# Patient Record
(demographics unavailable — no encounter records)

---

## 2025-04-29 NOTE — ASSESSMENT
[FreeTextEntry1] : The patient was advised of the diagnosis. The natural history of the pathology was explained in full to the patient in layman's terms. All questions were answered. The risks and benefits of surgical and non-surgical treatment alternatives were explained in full to the patient.  A brace was applied.  The importance of ice and elevation were discussed with the patient.  The risks were also discussed such as compartment syndrome and skin breakdown.  They were instructed to never put foreign objects down the brace.  Patients should call for increasing pain, worsening swelling, numbness, extremity discoloration, or any other concerns.   Left gamekeeper brace provided to patient.  Left thumb MRI rx provided to patient. Right elbow MRI rx provided to patient Right shoulder MRI rx provided to patient  RTO after MRI
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
## 2025-04-29 NOTE — IMAGING
[de-identified] : Left wrist/hand Multiple abrasions dorsum and radial aspect of the thumb with ecchymosis.  No snuff box ttp No distal radius ttp Proximal phalanx ttp thumb No UCL or RCL ttp Pain with stress of UCL with no opening to stress NVID  Right wrist No snuffbox ttp Dorsal SL, dorsal scaphoid ttp  Right shoulder Skin intact NVID + Impingement + empty can + Speed - Posterior lift off Abduction to 180 with pain FF to 100 TTP anterior shoulder biceps   Right elbow + ecchymosis over posterior aspect of olecranon 3-4 abrasions posterior elbow NVID TTP tip of olecranon
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
## 2025-05-08 NOTE — IMAGING
[de-identified] : Left wrist/hand Multiple abrasions dorsum and radial aspect of the thumb with ecchymosis.  No snuff box ttp No distal radius ttp Proximal phalanx ttp thumb No UCL or RCL ttp Pain with stress of UCL with no opening to stress NVID  Right wrist No snuffbox ttp Dorsal SL, dorsal scaphoid ttp  Right shoulder Skin intact NVID + Impingement + empty can + Speed - Posterior lift off Abduction to 180 with pain FF to 100 TTP anterior shoulder biceps   Right elbow + ecchymosis over posterior aspect of olecranon 3-4 abrasions posterior elbow NVID TTP tip of olecranon
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
## 2025-05-16 NOTE — IMAGING
[de-identified] : Spine: Inspection/Palpation: No tenderness to palpation throughout Cervical/thoracic/lumbar spine. No bony stepoffs, No lesions.  Gait: antalgic, able to perform bilateral toe and heel rise.      Neurologic:  Bilateral Lower Extremities 5/5 Iliopsoas/Quadriceps/Hamstrings/ Tibialis Anterior/ Gastrocnemius. Extensor Hallucis Longus/ Flexor Hallucis Longus except    Sensation intact to light touch L2-S1  Patellar/ Achilles reflex within normal limits.     No pain with IR/ER/Flexion of HIps bilaterally   X-ray Ap/Lateral/Flexion/Extension of lumbar spine were viewed and interpreted coronal curvature consistent with spasms  L4-5 and l5-s1 disc height loss. no instability on flexion or extension views
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
## 2025-05-28 NOTE — IMAGING
[de-identified] : Spine: Inspection/Palpation: No tenderness to palpation throughout Cervical/thoracic/lumbar spine. No bony stepoffs, No lesions.  Gait: antalgic, able to perform bilateral toe and heel rise.      Neurologic:  Bilateral Lower Extremities 5/5 Iliopsoas/Quadriceps/Hamstrings/ Tibialis Anterior/ Gastrocnemius. Extensor Hallucis Longus/ Flexor Hallucis Longus except    Sensation intact to light touch L2-S1  Patellar/ Achilles reflex within normal limits.     No pain with IR/ER/Flexion of HIps bilaterally   X-ray Ap/Lateral/Flexion/Extension of lumbar spine were viewed and interpreted coronal curvature consistent with spasms  L4-5 and l5-s1 disc height loss. no instability on flexion or extension views  MRI Thoracic spine reviewed and interpreted independently.  Agree with report as follows.  multilevel disc degeneration and hNP no cord compression full report in chart   MRI Lumbar spine reviewed and interpreted independently.  Agree with report as follows.  L4-5 and l5-s1 hnp

## 2025-06-19 NOTE — ASSESSMENT
[FreeTextEntry1] : The patient was advised of the diagnosis. The natural history of the pathology was explained in full to the patient in layman's terms. All questions were answered. The risks and benefits of surgical and non-surgical treatment alternatives were explained in full to the patient.  pt provided left posterior subacromial CSI #1 today. Large joint injection of the left shoulder was performed. The indication for this procedure was pain, inflammation. The site was prepped with alcohol, ethyl chloride sprayed topically and sterile technique used. An injection of Lidocaine 3cc of 2% , Bupivacaine (Marcaine) 3cc of 0.5% , Triamcinolone (Kenalog) 2cc of 40 mg  was used.   Patient tolerated procedure well. Patient was advised to call if redness, pain or fever occur and apply ice for 15 minutes out of every hour for the next 12-24 hours as tolerated. The risks benefits, and alternatives have been discussed, and verbal consent was obtained.  D/C gamekeeper brace.  Continue PT to right shoulder x 6 weeks.

## 2025-06-19 NOTE — IMAGING
[de-identified] : Left wrist/hand Multiple abrasions dorsum and radial aspect of the thumb with ecchymosis have resolved  No snuff box ttp No distal radius ttp Proximal phalanx ttp thumb No UCL or RCL ttp no pain with stress of UCL with no opening to stress NVID  Right shoulder Skin intact NVID mildly + Impingement mildly + empty can - Speed - Posterior lift off Abduction to 120 deg with pain FF to 160 no ttp  Right elbow - ecchymosis  abrasions have resolved NVID no longer tip of olecranon
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
